# Patient Record
Sex: FEMALE | Race: ASIAN
[De-identification: names, ages, dates, MRNs, and addresses within clinical notes are randomized per-mention and may not be internally consistent; named-entity substitution may affect disease eponyms.]

---

## 2020-02-14 ENCOUNTER — RECORDS - HEALTHEAST (OUTPATIENT)
Dept: ADMINISTRATIVE | Facility: OTHER | Age: 25
End: 2020-02-14

## 2020-02-17 ENCOUNTER — COMMUNICATION - HEALTHEAST (OUTPATIENT)
Dept: ADMINISTRATIVE | Facility: CLINIC | Age: 25
End: 2020-02-17

## 2020-02-18 ENCOUNTER — OFFICE VISIT - HEALTHEAST (OUTPATIENT)
Dept: EDUCATION SERVICES | Facility: CLINIC | Age: 25
End: 2020-02-18

## 2020-02-18 DIAGNOSIS — O24.410 DIET CONTROLLED GESTATIONAL DIABETES MELLITUS (GDM) IN THIRD TRIMESTER: ICD-10-CM

## 2020-02-25 ENCOUNTER — AMBULATORY - HEALTHEAST (OUTPATIENT)
Dept: EDUCATION SERVICES | Facility: CLINIC | Age: 25
End: 2020-02-25

## 2020-02-25 DIAGNOSIS — O24.410 DIET CONTROLLED GESTATIONAL DIABETES MELLITUS (GDM) IN THIRD TRIMESTER: ICD-10-CM

## 2020-03-10 ENCOUNTER — AMBULATORY - HEALTHEAST (OUTPATIENT)
Dept: EDUCATION SERVICES | Facility: CLINIC | Age: 25
End: 2020-03-10

## 2020-03-10 DIAGNOSIS — O24.410 DIET CONTROLLED GESTATIONAL DIABETES MELLITUS (GDM) IN THIRD TRIMESTER: ICD-10-CM

## 2020-03-31 ENCOUNTER — OFFICE VISIT - HEALTHEAST (OUTPATIENT)
Dept: EDUCATION SERVICES | Facility: CLINIC | Age: 25
End: 2020-03-31

## 2020-03-31 DIAGNOSIS — O24.410 DIET CONTROLLED GESTATIONAL DIABETES MELLITUS (GDM) IN THIRD TRIMESTER: ICD-10-CM

## 2020-04-14 ENCOUNTER — OFFICE VISIT - HEALTHEAST (OUTPATIENT)
Dept: EDUCATION SERVICES | Facility: CLINIC | Age: 25
End: 2020-04-14

## 2020-04-14 DIAGNOSIS — O24.419 GESTATIONAL DIABETES: ICD-10-CM

## 2020-04-22 ENCOUNTER — AMBULATORY - HEALTHEAST (OUTPATIENT)
Dept: EDUCATION SERVICES | Facility: CLINIC | Age: 25
End: 2020-04-22

## 2020-04-22 DIAGNOSIS — O24.410 DIET CONTROLLED GESTATIONAL DIABETES MELLITUS (GDM), ANTEPARTUM: ICD-10-CM

## 2020-05-05 ENCOUNTER — HOME CARE/HOSPICE - HEALTHEAST (OUTPATIENT)
Dept: HOME HEALTH SERVICES | Facility: HOME HEALTH | Age: 25
End: 2020-05-05

## 2021-06-04 VITALS — BODY MASS INDEX: 28.58 KG/M2 | WEIGHT: 166.5 LBS

## 2021-06-06 NOTE — PROGRESS NOTES
[facility] GDM Care Plan    Assessment: pt seen today for f/u. She brings in BG and food log.   FBG: WNL - 1 reading at 96  B: 2 elevations  L: 2 elevations  D: 4 elevations  Ketones: small X2, otherwise normal    Elevations make sense. Discussed watching portions of rice and noodles. 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 Pt will try to manage meals. Will f/u in 2 weeks, discussed if still elevating will likely need insulin. Pt will call prior w/ any questions.       Visit Type: GDM Individual Follow-up  Time: 30  Visit #: 2  Provider: Sd   Provider's Diagnosis (per referral form): Gestational (648.83)  Weight:    OGTT: No results found for this or any previous visit.   Estimated Date of Delivery: 5/4/20  Pregnancy #: 3  Previous GDM: No   Medications:   Current Outpatient Medications:      acetone, urine, test (ACETONE, URINE, TEST) Strp, Use 1 each As Directed every morning., Disp: 50 strip, Rfl: 3     blood glucose test (CONTOUR NEXT TEST STRIPS) strips, Use 1 each As Directed 4 (four) times a day., Disp: 150 strip, Rfl: 3     generic lancets, Use 1 each As Directed 4 (four) times a day., Disp: 100 each, Rfl: 3    BG monitoring goals: Fasting <95; 1 hour post start of meal <140. Test 4 x per day.  Check fasting a.m. ketones: Yes  GDM meal pattern/carb counting taught per guidelines: Yes    Past Goals:  Nutrition: GDM meal plan MOSTLY ME T  Activity: Walking after meals when able/staying active SOMETIMES MET   Monitoring: BG 4x/day as directed, ketones every morning MET       New Goals:  Nutrition: GDM meal plan   Activity: Walking after meals when able/staying active   Monitoring: BG 4x/day as directed, ketones every morning    DIABETES CARE PLAN AND EDUCATION RECORD    Gestational Diabetes Disease Process/Preconception Care/Management During Pregnancy/Postpartum:Discussed    Meter (per above goals): Discussed    Nutrition Management    Weight: Assessed and  Discussed  Portions/Balance: Assessed and Discussed  Carb ID/Count: Assessed and Discussed  Label Reading: Assessed and Discussed  Menu Planning: Assessed and Discussed  Dining Out: Assessed and Discussed  Physical Activity: Assessed and Discussed    Acute Complications: Prevent, Detect, Treat:    Goal Setting and Problem Solving: Assessed and Discussed  Barriers: Assessed and Discussed  Psychosocial Adjustments: Assessed and Discussed

## 2021-06-06 NOTE — PROGRESS NOTES
[facility] GDM Care Plan    Assessment: pt seen today for f/u. She brings in BG log. Doing very well.   Last week:   FBG: elevated X1  B: all WNL  L: elevated X1  D: all WNL  Week prior:   FBG: all WNL  B: elevated x2  L: elevated X2  D: all WNL  Ketones: one small this week  Pt is doing well with diet. She feels she is getting plenty to eat and it is not difficult. Pt will continue with everything. Will f/u in 3 weeks. Will call sooner if she starts to have 3 elevations/week or any other concerns.       Visit Type: GDM Individual Follow-up  Time: 30  Provider: Sd  Provider's Diagnosis (per referral form): Gestational (648.83)  Weight: 166 lb 8 oz (75.5 kg)  OGTT: No results found for this or any previous visit.   Estimated Date of Delivery: 5/4/20  Pregnancy #: 3  Previous GDM: No   Medications:   Current Outpatient Medications:      acetone, urine, test (ACETONE, URINE, TEST) Strp, Use 1 each As Directed every morning., Disp: 50 strip, Rfl: 3     blood glucose test (CONTOUR NEXT TEST STRIPS) strips, Use 1 each As Directed 4 (four) times a day., Disp: 150 strip, Rfl: 3     generic lancets, Use 1 each As Directed 4 (four) times a day., Disp: 100 each, Rfl: 3    BG monitoring goals: Fasting <95; 1 hour post start of meal <140. Test 4 x per day.  Check fasting a.m. ketones: Yes  GDM meal pattern/carb counting taught per guidelines: Yes    Past Goals:  Nutrition: GDM meal plan MET  Activity: Walking after meals when able/staying active MET  Monitoring: BG 4x/day as directed, ketones every morning MET      New Goals:  Nutrition: GDM meal plan   Activity: Walking after meals when able/staying active   Monitoring: BG 4x/day as directed, ketones every morning    DIABETES CARE PLAN AND EDUCATION RECORD    Gestational Diabetes Disease Process/Preconception Care/Management During Pregnancy/Postpartum:Discussed    Meter (per above goals): Discussed    Nutrition Management    Weight: Assessed and Discussed  Portions/Balance:  Assessed and Discussed  Carb ID/Count: Assessed and Discussed  Label Reading: Assessed and Discussed  Menu Planning: Assessed and Discussed  Dining Out: Assessed and Discussed  Physical Activity: Assessed and Discussed    Acute Complications: Prevent, Detect, Treat:    Goal Setting and Problem Solving: Assessed and Discussed  Barriers: Assessed and Discussed  Psychosocial Adjustments: Assessed and Discussed

## 2021-06-06 NOTE — TELEPHONE ENCOUNTER
Date: 2/18/2020 Status: Arrived   Time: 1:00 PM Length: 60   Visit Type: CONSULT [[id number]] Copay: $0.00   Provider: Parisa Ragsdale RN

## 2021-06-06 NOTE — TELEPHONE ENCOUNTER
External - [facility]   Referring Provider: Marcelle Beaver  DX: GDM     Ref./rec. Were received on... 2/14/2020 12:29pm (inside DM consult)

## 2021-06-06 NOTE — PROGRESS NOTES
[facility] GDM Care Plan    Assessment: pt seen today for initial visit. She was able to check BG w/o difficulty. BG was 97 mg/dl in clinic. She has not eaten lunch yet.   She normally does not eat breakfast.     Plan: GDM meal plan, check BG four times a day as instructed and ketones every morning. Will f/u next week as scheduled.     Provider: Sd   Provider's Diagnosis (per referral form): Gestational (648.83)  Weight:    OGTT: No results found for this or any previous visit.  LUIS MANUEL: 5/4/20  Pregnancy #: 3, has 7 y/o boy and 4 y/o girl, is waiting to find out gender   Previous GDM: No  Medications: No current outpatient medications on file.    BG monitoring goals: Fasting <95; 1 hour post start of meal <140. Test 4 x per day.  Check fasting a.m. ketones: Yes  GDM meal pattern/carb counting taught per guidelines: Yes  Goals: Nutrition: GDM meal plan  Activity:  Walking after meals when able/staying active  Monitoring:  BG 4x/day as directed, ketones every morning    F/u in 1 week to assess BG and food log     DIABETES CARE PLAN AND EDUCATION RECORD    Gestational Diabetes Disease Process/Preconception Care/Management During Pregnancy/Postpartum:    Meter (per above goals): Discussed, Literature provided and Patient returned demonstration    Nutrition Management    Weight: Assessed, Discussed and Literature provided  Portions/Balance: Assessed, Discussed and Literature provided  Carb ID/Count: Assessed, Discussed and Literature provided  Label Reading: Assessed, Discussed and Literature provided  Menu Planning: Assessed, Discussed and Literature provided  Dining Out: Assessed, Discussed and Literature provided  Physical Activity: Discussed and Literature provided    Acute Complications: Prevent, Detect, Treat:    Goal Setting and Problem Solving: Assessed and Discussed  Barriers: Assessed and Discussed  Psychosocial Adjustments: Assessed and Discussed      Time: 60  Visit Type: GDM Individual   Visit #: 1

## 2021-06-07 NOTE — PROGRESS NOTES
Gela Hogan is a 25 y.o. female who is being evaluated via a billable video visit.      Patient has given verbal consent to a Video visit? Yes    Patient would like to receive their AVS by AVS Preference: Alvin.    Patient would like the video invitation sent by: Text to cell phone: [phone]      Video Start Time: 11:35 am    Video-Visit Details    Type of service:  Video Visit    Video End Time (time video stopped): 11:52 am    Originating Location (pt. Location): Home    Distant [facility] (provider [facility]  [place]     Mode of Communication:  Video Conference via [facility]-GDM Care Plan Follow Up  Assessment:       Gela is here for follow up GDM visit via video visit.Follow up to review BG, ketones and activity progress.     Answered questions regarding GDM, reviewed BG log:  DATE Ketones Fasting BG (mg/dL) 1 hour Post Breakfast BG (mg/dL) 1 hour Post Lunch BG (mg/dL) 1 hour post Dinner BG (mg/dL)   4/14 negative 81 - - -   4/13 negative 93 134 133 -   4/12 small 80 - - -   4/11 negative 96 - - -   4/10 small 77 - - -   4/9 --- 87 - - -   4/8 small -- - - -       -Reviewed GDM disease state, risks of poor control, BG control, Checking BG 4x daily. Discussed carbohydrate goals, GDM meal plan at each meal, snacks and when to check BG.   -Reviewed the importance of checking BG 1 hour after each meal and checking ketones/fasting BG daily. Pt reports eating 3 meals daily (sometimes only snacking for breakfast).  Breakfast: nuts/granola bar  Lunch: 1 cup rice, meat, vegetables  Snack: nuts/fruit  Dinner: 1 cup rice, meat, vegetables  Snack: trying to have a bedtime snack of protein/CHO - but admits this is hard for her  -Pt has glucometer and has no difficulty using  -Reviewed continuing to check BG 4 times daily and Ketones each morning along with keeping a log of everything.     Plan:     1. Check BG 4x daily  Fasting and 1 hr Post Prandial (Fasting: <95 mg/dL and 1 hr PP  <140 mg/dL).  2. Follow GDM Meal Plan   3. Aim for 30 minutes of activity daily  4. Call if positive Ketones or BG above goals 3x in one week.  5. Plans to follow up with CDE in 1 week    Subjective and Objective:     Visit Type: GDM Individual Follow-up  Referring Provider: Dr. Beaver - [facility]  Provider's Diagnosis (per referral form): Gestational (648.83)  Weight:    OGTT: No results found for this or any previous visit.   Estimated Date of Delivery: 05/04/2020  Pregnancy #: 3 - boy & girl at home. Having a boy  Previous GDM: No   Medications:   Current Outpatient Medications:      acetone, urine, test (ACETONE, URINE, TEST) Strp, Use 1 each As Directed every morning., Disp: 50 strip, Rfl: 3     blood glucose test (CONTOUR NEXT TEST STRIPS) strips, Use 1 each As Directed 4 (four) times a day., Disp: 150 strip, Rfl: 3     generic lancets, Use 1 each As Directed 4 (four) times a day., Disp: 100 each, Rfl: 3    BG monitoring goals: Fasting <95; 1 hour post start of meal <140. Test 4 x per day.  Check fasting a.m. ketones: Yes  GDM meal pattern/carb counting taught per guidelines: Yes    Past Goals:  Nutrition: GDM meal plan meeting 75%  Activity: Walking after meals when able/staying active meeting 75%  Monitoring: BG 4x/day as directed, ketones every morning meeting 25%     Goals       Activity      Increase activity to 30 minutes/day        Healthy Eating      Monitor po intake aiming for 3 meals and 3 snacks based on GDM meal plan        Monitoring      Check BG 4x daily and ketones daily             Education:     Gestational Diabetes Disease Process/Preconception Care/Management During Pregnancy/Postpartum: Discussed  Literature provided at previous visit  Monitoring   Meter (per above goals): Discussed   Monitoring: Discussed  BG goals: Discussed      Nutrition Management  Nutrition Management: Discussed   Weight: Discussed   Portions/Balance: Discussed   Carb ID/Count: Discussed   Physical Activity:  Discussed   Medications: Discussed  Injected Medications: Not addressed   Storage/Exp:Not addressed   Site Rotation: Not addressed      Gestational Diabetes Disease Process: Discussed      Acute Complications: Prevent, Detect, Treat:  Goal Setting and Problem Solving: Discussed  Barriers: Discussed  Psychosocial Adjustments: Discussed     Time spent with the patient: 17 minutes via video visit  Previous Education: yes  Visit Type: GDM Individual     Laurie Renteria RDN, BRIANNA, CDCES   Certified Diabetes Care &   4/14/2020

## 2021-06-07 NOTE — PROGRESS NOTES
Diabetes Educator has received verbal consent for a telephone visit for the patient./19 minutes    [facility] GDM Care Plan    Assessment:   --telephone follow up visit with Gela for GDM  --patient states she remains active in her home walking  --she is consuming 2-3 meals/day, plus a few snacks:  Am: granola bar or mixed nuts with water or milk  Noon: rice(one cup or less) meat/vegetables  Dinner: similar meal as noon time  Beverages: water, milk, regular soda  --patient states she has been craving ice and likes to have a regular soda over the ice    Blood glucose record 4/15-4/22:  FBS: 81,86,81,99,77,90,77  Post am: 89+2  Post noon: 123  Post dinner: 145,138,103,76+2  --    Education:  --reviewed importance of checking BG after her meals and also encouraged Gela to not have regular soda, to try sparkling rider instead, not flavored with artificial sweeteners.     Plan:  1. Patient to check glucose 4x/day: fasting and after all of her meals, plus daily ketones.  2. Patient to call into the Diabetes Care Team if she notices three elevations in one week that she cannot explain or small, moderate, large ketones.      Visit Type: GDM Individual Follow-up  Time: 19 minutes  Visit #: 6  Provider: Dr. Beaver-[facility]  Provider's Diagnosis (per referral form): Gestational (648.83)  Weight:    OGTT: No results found for this or any previous visit.   Estimated Date of Delivery: 5/4/20   Pregnancy #: 3  Previous GDM: No   Medications:   Current Outpatient Medications:      acetone, urine, test (ACETONE, URINE, TEST) Strp, Use 1 each As Directed every morning., Disp: 50 strip, Rfl: 3     blood glucose test (CONTOUR NEXT TEST STRIPS) strips, Use 1 each As Directed 4 (four) times a day., Disp: 150 strip, Rfl: 3     generic lancets, Use 1 each As Directed 4 (four) times a day., Disp: 100 each, Rfl: 3    BG monitoring goals: Fasting <95; 1 hour post start of meal <140. Test 4 x per day.  Check fasting a.m. ketones: Yes  GDM meal  pattern/carb counting taught per guidelines: Yes    Past Goals:  Nutrition: GDM meal plan -on track  Activity: Walking after meals when able/staying active -walking in her home  Monitoring: BG 4x/day as directed, ketones every morning -not on track with testing glucose 4x/day      New Goals:  Nutrition: GDM meal plan   Activity: Walking after meals when able/staying active   Monitoring: BG 4x/day as directed, ketones every morning    DIABETES CARE PLAN AND EDUCATION RECORD    Gestational Diabetes Disease Process/Preconception Care/Management During Pregnancy/Postpartum:Discussed    Meter (per above goals): Assessed and Discussed    Nutrition Management    Weight: Assessed and Discussed  Portions/Balance: Assessed and Discussed  Carb ID/Count: Assessed and Discussed  Label Reading: Assessed and Discussed  Menu Planning: Assessed and Discussed  Dining Out: Assessed and Discussed  Physical Activity: Assessed and Discussed    Acute Complications: Prevent, Detect, Treat:    Goal Setting and Problem Solving: Assessed and Discussed  Barriers: Assessed and Discussed  Psychosocial Adjustments: Assessed and Discussed

## 2021-06-07 NOTE — PROGRESS NOTES
"[facility] GDM Care Plan    Assessment: visit converted to telephone call. Spoke with pt over the phone. She reports the most recent BG readings, starting w/ today:   FB, 87, 84, --, 82, 91, 84, 89, 84, 81, 79  After lunch: 139, 76  After dinner: 109, 103, 132, 138, 138, 135, 145, 117    Pt states she has not been checking much after breakfast and lunch because she has not been eating \"meals\" she states it is more so snacking throughout the day. She notes her daily scheduled has changed with the quarantine. Her ketones have been fine. Encouraged pt to try to check 1 hour after eating the biggest \"snacks.\" Otherwise she is doing well.   Will call pt again in 2 weeks for f/u as scheduled. She will call sooner w/ any concerns.     Visit Type: GDM Individual Follow-up  Time:   Provider: Sd  Provider's Diagnosis (per referral form): Gestational (648.83)  Weight:    OGTT: No results found for this or any previous visit.   Estimated Date of Delivery: 20  Pregnancy #: 3  Previous GDM: No   Medications:   Current Outpatient Medications:      acetone, urine, test (ACETONE, URINE, TEST) Strp, Use 1 each As Directed every morning., Disp: 50 strip, Rfl: 3     blood glucose test (CONTOUR NEXT TEST STRIPS) strips, Use 1 each As Directed 4 (four) times a day., Disp: 150 strip, Rfl: 3     generic lancets, Use 1 each As Directed 4 (four) times a day., Disp: 100 each, Rfl: 3    BG monitoring goals: Fasting <95; 1 hour post start of meal <140. Test 4 x per day.  Check fasting a.m. ketones: Yes  GDM meal pattern/carb counting taught per guidelines: Yes    Past Goals:  Nutrition: GDM meal plan MOSTLY MET   Activity: Walking after meals when able/staying active MOSTLY MET   Monitoring: BG 4x/day as directed, ketones every morning MOSTLY MET       New Goals:  Nutrition: GDM meal plan   Activity: Walking after meals when able/staying active   Monitoring: BG 4x/day as directed, ketones every morning    DIABETES CARE PLAN AND EDUCATION " RECORD    Gestational Diabetes Disease Process/Preconception Care/Management During Pregnancy/Postpartum:Discussed    Meter (per above goals): Discussed    Nutrition Management    Weight: Assessed and Discussed  Portions/Balance: Assessed and Discussed  Carb ID/Count: Assessed and Discussed  Label Reading: Assessed and Discussed  Menu Planning: Assessed and Discussed  Dining Out: Assessed and Discussed  Physical Activity: Assessed and Discussed    Acute Complications: Prevent, Detect, Treat:    Goal Setting and Problem Solving: Assessed and Discussed  Barriers: Assessed and Discussed  Psychosocial Adjustments: Assessed and Discussed

## 2021-06-07 NOTE — PATIENT INSTRUCTIONS - HE
Goals for Diabetes Care:    1. Eat balanced meals (3 meals + 3 snacks daily)    Breakfast: 30 grams carbohydrate + Protein  Snack: 30 grams carbohydrate + protein  Lunch: 60 grams carbohydrate + protein/vegetables  Snack: 30 grams Carbohydrate + protein  Dinner: 60 grams carbohydrate + protein/vegetables  Bedtime Snack: 30 grams + protein    ----Make sure you include protein source with each meal and at bedtime - this has been shown to help with blood glucose elevations    2. Each Morning Check Ketones (small/mod/high - call Diabetes Care Line)  Your goal is negative or trace ketones. If you have ketones in your urine it means you are not eating enough before you go to bed. Eat a larger bedtime snack and include protein.     3. Aim to get at least 30 minutes of activity each day. Activity really helps improve blood sugars.     4. Blood Glucose Targets:   1. Fasting Less than 95 mg/dL   2. 1 hours after a meal target is less than 140 mg/dL  ----Always remember to bring meter and log book to all appointments.      Follow up with your Diabetic Educator as needed to assess BG targets in 1 week.  If Blood glucose levels are above normal 3 times or more in one week and you cannot explain them or if you develop small, moderate or high ketones call [facility] at [phone]    Call with any questions.    Thank you,  Laurie Renteria RDN, BRIANNA, CDCES   Certified Diabetes Care &   Outpatient [facility]  [phone]

## 2021-07-14 PROBLEM — Z34.90 PREGNANT: Status: RESOLVED | Noted: 2020-05-04 | Resolved: 2020-05-05

## 2023-10-16 ENCOUNTER — LAB REQUISITION (OUTPATIENT)
Dept: LAB | Facility: CLINIC | Age: 28
End: 2023-10-16
Payer: COMMERCIAL

## 2023-10-16 DIAGNOSIS — Z33.1 PREGNANT STATE, INCIDENTAL: ICD-10-CM

## 2023-10-16 PROCEDURE — 86901 BLOOD TYPING SEROLOGIC RH(D): CPT | Mod: ORL | Performed by: OBSTETRICS & GYNECOLOGY

## 2023-10-16 PROCEDURE — 84702 CHORIONIC GONADOTROPIN TEST: CPT | Mod: ORL | Performed by: OBSTETRICS & GYNECOLOGY

## 2023-10-17 LAB
ABO/RH(D): NORMAL
ANTIBODY SCREEN: NEGATIVE
HCG INTACT+B SERPL-ACNC: 61 MIU/ML
SPECIMEN EXPIRATION DATE: NORMAL

## 2023-10-18 ENCOUNTER — LAB REQUISITION (OUTPATIENT)
Dept: LAB | Facility: CLINIC | Age: 28
End: 2023-10-18
Payer: COMMERCIAL

## 2023-10-18 DIAGNOSIS — Z33.1 PREGNANT STATE, INCIDENTAL: ICD-10-CM

## 2023-10-18 PROCEDURE — 84702 CHORIONIC GONADOTROPIN TEST: CPT | Mod: ORL | Performed by: OBSTETRICS & GYNECOLOGY

## 2023-10-19 LAB — HCG INTACT+B SERPL-ACNC: 24 MIU/ML
